# Patient Record
Sex: FEMALE | Race: WHITE | NOT HISPANIC OR LATINO | URBAN - METROPOLITAN AREA
[De-identification: names, ages, dates, MRNs, and addresses within clinical notes are randomized per-mention and may not be internally consistent; named-entity substitution may affect disease eponyms.]

---

## 2023-07-27 ENCOUNTER — EMERGENCY (EMERGENCY)
Facility: HOSPITAL | Age: 68
LOS: 0 days | Discharge: HOME | End: 2023-07-27
Attending: EMERGENCY MEDICINE
Payer: MEDICARE

## 2023-07-27 VITALS
HEART RATE: 75 BPM | RESPIRATION RATE: 16 BRPM | SYSTOLIC BLOOD PRESSURE: 135 MMHG | TEMPERATURE: 98 F | DIASTOLIC BLOOD PRESSURE: 81 MMHG | OXYGEN SATURATION: 99 %

## 2023-07-27 VITALS — HEIGHT: 70 IN | WEIGHT: 164.91 LBS

## 2023-07-27 DIAGNOSIS — E03.9 HYPOTHYROIDISM, UNSPECIFIED: ICD-10-CM
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
## 2023-07-27 DIAGNOSIS — S76.112A STRAIN OF LEFT QUADRICEPS MUSCLE, FASCIA AND TENDON, INITIAL ENCOUNTER: ICD-10-CM
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
## 2023-07-27 DIAGNOSIS — S82.002A UNSPECIFIED FRACTURE OF LEFT PATELLA, INITIAL ENCOUNTER FOR CLOSED FRACTURE: ICD-10-CM

## 2023-07-27 PROCEDURE — 76376 3D RENDER W/INTRP POSTPROCES: CPT

## 2023-07-27 PROCEDURE — 73700 CT LOWER EXTREMITY W/O DYE: CPT | Mod: MG,LT

## 2023-07-27 PROCEDURE — 73562 X-RAY EXAM OF KNEE 3: CPT | Mod: 26,LT

## 2023-07-27 PROCEDURE — 73562 X-RAY EXAM OF KNEE 3: CPT | Mod: LT

## 2023-07-27 PROCEDURE — G1004: CPT

## 2023-07-27 PROCEDURE — 76376 3D RENDER W/INTRP POSTPROCES: CPT | Mod: 26

## 2023-07-27 PROCEDURE — 99285 EMERGENCY DEPT VISIT HI MDM: CPT

## 2023-07-27 PROCEDURE — 99284 EMERGENCY DEPT VISIT MOD MDM: CPT | Mod: 25

## 2023-07-27 PROCEDURE — 73700 CT LOWER EXTREMITY W/O DYE: CPT | Mod: 26,LT,MG

## 2023-07-27 PROCEDURE — 73552 X-RAY EXAM OF FEMUR 2/>: CPT | Mod: 26,LT

## 2023-07-27 PROCEDURE — 73552 X-RAY EXAM OF FEMUR 2/>: CPT | Mod: LT

## 2023-07-27 NOTE — ED STATDOCS - NSFOLLOWUPINSTRUCTIONS_ED_ALL_ED_FT
Take 325MG OF ASPIRIN DAILY prior to and for a few days after flight home.             Quadriceps Tendon Tear  Side view of the leg muscles showing the quadriceps tendon.  A quadriceps tendon tear or disruption is a partial or complete tear of the tendon between the quadriceps muscles and the kneecap (patella). Tendons connect muscles to bone. The quadriceps muscles are located on the front of the thigh and are primarily used in straightening the knee.    With a partial tear, the tendon is overstretched, and some of the fibers are frayed. With a complete tear, the quadriceps muscle is detached from the kneecap. This is very rare.    What are the causes?  This condition can be caused by an injury such as:  A deep cut on your thigh that injures the tendon.  Falling on your knee, which may result in breaking your patella.  Falling with your knee in a bent position, such as tripping when going down stairs.  The condition can also occur if you jump and land flat on your foot with your knee bent, causing a quick and forceful tightening (contraction) of your quadriceps.    What increases the risk?  The following factors may make you more likely to develop this condition:  Participating in:  Activities that involve jumping, such as basketball.  Activities in which your knee muscles contract suddenly and forcefully, such as doing jumps or moguls in downhill skiing.  Having a weakened tendon from:  Long-term (chronic) quadriceps tendinitis.  Long periods of not moving your knee (immobilization).  Repeated steroid injections into the quadriceps tendon.  Medical conditions such as diabetes, lupus, or rheumatoid arthritis.  Degeneration over time. Most quadriceps tendon tears occur in males over 40 years of age.  What are the signs or symptoms?  Symptoms of this condition include:  Hearing a popping sound or feeling a tear above your patella at the time of injury.  Pain and tenderness over your thigh. The pain may get worse when you use the quadriceps muscles.  Bruising.  Difficulty walking, or a feeling that your knee may buckle.  A sagging kneecap or an indentation above your kneecap.  Not being able to straighten your knee.  How is this diagnosed?  This condition may be diagnosed based on:  Your symptoms and medical history.  A physical exam. During the exam, your health care provider will:  Feel the area above your kneecap.  Test the motion and strength of your knee.  Imaging tests to rule out other conditions and to confirm the diagnosis. These may include:  X-rays to check for a bone injury, such as a fracture.  An ultrasound or an MRI to look at the muscles and tendons around your knee.  How is this treated?  This condition may be treated with:  Medicines to help reduce pain and inflammation.  RICE therapy. This includes resting, icing, applying pressure (compression), and raising (elevating) the injured area.  A knee brace (immobilizer) to keep the knee straight while the tendon heals. Typically, the brace will be worn for about 6 weeks.  Crutches to keep weight off of your injured leg.  Physical therapy to improve movement and strength in your leg.  If the injury involves a complete tear of the tendon, surgery is usually needed.    Follow these instructions at home:  RICE therapy    Bag of ice on a towel on the skin.  Rest the injured leg.  If directed, put ice on the injured area. To do this:  If you have a removable knee brace, remove it as told by your health care provider.  Put ice in a plastic bag.  Place a towel between your skin and the bag.  Leave the ice on for 20 minutes, 2–3 times a day.  Remove the ice if your skin turns bright red. This is very important. If you cannot feel pain, heat, or cold, you have a greater risk of damage to the area.  Apply a compression bandage to the area as told by your health care provider.  Elevate the injured area above the level of your heart while you are sitting or lying down.  If you have a removable knee brace:    Wear the brace as told by your health care provider. Remove it only as told by your health care provider.  Check the skin around the brace every day. Tell your health care provider about any concerns.  Loosen the brace if your toes tingle, become numb, or turn cold and blue.  Keep the brace clean.  If the brace is not waterproof:  Do not let it get wet.  Cover it with a watertight covering when you take a bath or shower.  Ask your health care provider when it is safe to drive if you have a knee brace.  Activity    Do not use the injured limb to support your body weight until your health care provider says that you can. Use crutches as told by your health care provider.  Do exercises as told by your health care provider.  Return to your normal activities as told by your health care provider. Ask your health care provider what activities are safe for you.  General instructions    Take over-the-counter and prescription medicines only as told by your health care provider.  Do not use any products that contain nicotine or tobacco. These products include cigarettes, chewing tobacco, and vaping devices, such as e-cigarettes. These can delay healing. If you need help quitting, ask your health care provider.  Keep all follow-up visits. This is important.  How is this prevented?  Warm up and stretch before being active.  Cool down and stretch after being active.  Give your body time to rest between periods of activity.  Maintain physical fitness, including:  Strength.  Flexibility.  Be safe and responsible while being active. This will help you avoid falls.  Contact a health care provider if:  Your pain and swelling continue or get worse, even with treatment and rest.  You are unable to walk or stand without your knee feeling like it will buckle.  Get help right away if:  You are unable to straighten your knee from a bent position.  Summary  A quadriceps tendon tear or disruption is a partial or complete tear of the tendon between the quadriceps muscles and the kneecap.  This condition is caused by an injury to the area.  This condition is treated with RICE therapy, physical therapy, and medicines. Surgery is often needed if the tendon is completely torn.  Do not use the injured limb to support your body weight until your health care provider says that you can. Use crutches as told by your health care provider.  Return to your normal activities as told by your health care provider. Ask your health care provider what activities are safe for you.  This information is not intended to replace advice given to you by your health care provider. Make sure you discuss any questions you have with your health care provider.    Document Revised: 06/03/2022 Document Reviewed: 06/03/2022

## 2023-07-27 NOTE — ED STATDOCS - CARE PLAN
Principal Discharge DX:	Rupture of left quadriceps tendon  Secondary Diagnosis:	Fracture of patella   1

## 2023-07-27 NOTE — ED ADULT TRIAGE NOTE - CHIEF COMPLAINT QUOTE
pt presents to ed S/P MECHANICAL fall onto left hip and knee with deformity - no blood thinner or head strike

## 2023-07-27 NOTE — ED STATDOCS - PROGRESS NOTE DETAILS
68-year-old female with past medical history of hypothyroidism presents to the ED complaining of pain in left knee status post fall back onto right hip this morning.  Patient reports right knee twisted in fall.  On exam, (+) Deformity to superior aspect of left patealla.  Pt unable to straight leg raise on left.  Xrays reviewed, (+) Avulsion fx to superior aspect of patella.  Called Ortho for eval.  Benita Angulo PA-C

## 2023-07-27 NOTE — CONSULT NOTE ADULT - SUBJECTIVE AND OBJECTIVE BOX
Orthopedics Consult Note:    This is a 68y Female who presents to the ED today with c/o left knee swelling, pain, LROM and difficulty ambulating s/p fall today after slip on wet rock. Pt denies any other injuries. Pt denies any numbness, tingling or parethesias.    Past Medical and Surgical History:  MEWS Score    Hypothyroid    FALL    [email or web address]        Allergies:  No Known Allergies      Labs:          Imaging:  X-rays of the left knee demonstrate patella spur with questionable avulsion fx with small bony fragments     PE left knee:  +deformity of quads, palpable defect.  +mild-moderate swelling, skin intact, no erythema, no ecchymosis, normothermic. +palpable sulcus at quads tendon with +TTP. LROM 2' weakness and pain, unable to extend knee. Pt unable to SLR. No pain with axial loading, no pain with log roll. Moving all toes, sensation intact. DP and PT pulses 2+.      Assessment:  68y Female with a left quads tendon rupture s/p fall today.    Plan:  Pt placed in bulky see knee immobilizer.  WBAT LLE with bulky see knee immobilizer.  CT scan left knee   Pain control.  Ice application.  LLE elevation.  Pt advised that surgical repair of quads tendon will be necessary.  pt is on vacation here from [place], is supposed to fly home this Tuesday.    Options risks benefits complications reviewed with patient regarding surgical intervention. Pt wishes to proceed with surgery in her home town as to assure adequate follow up and post op management   recommend DVT ppx , ASA 325mg BID in the meantime   case discussed with attending on call Dr Gleason who is aware and agrees with plan

## 2023-07-27 NOTE — ED STATDOCS - MUSCULOSKELETAL, MLM
Left knee muscle defect distal quadriceps. Pt is not able to flex at knee, pain with passive ROM. Distal neurovascular motor intact.

## 2023-07-27 NOTE — ED STATDOCS - PATIENT PORTAL LINK FT
You can access the FollowMyHealth Patient Portal offered by [facility] by registering at the following website: [email or web address]. By joining [facility]’s FollowMyHealth portal, you will also be able to view your health information using other applications (apps) compatible with our system.

## 2023-07-27 NOTE — ED STATDOCS - OBJECTIVE STATEMENT
67 y/o female with PMHx of hypothyroid presents to the ED s/p mechanical fall at 10AM today. Pt states she slipped and fell onto her right side and left leg twisted underneath her. Took 2 Aleve PTA. Pt states unable to flex the knee due to the pain. NKDA.

## 2024-10-01 NOTE — ED STATDOCS - ENMT, MLM
Nasal mucosa clear.  Mouth with normal mucosa  Throat has no vesicles, no oropharyngeal exudates and uvula is midline. 24 Nasal mucosa clear.  Mouth with normal mucosa